# Patient Record
Sex: FEMALE | Race: WHITE | NOT HISPANIC OR LATINO | Employment: OTHER | URBAN - METROPOLITAN AREA
[De-identification: names, ages, dates, MRNs, and addresses within clinical notes are randomized per-mention and may not be internally consistent; named-entity substitution may affect disease eponyms.]

---

## 2018-04-08 ENCOUNTER — OFFICE VISIT (OUTPATIENT)
Dept: URGENT CARE | Age: 58
End: 2018-04-08
Payer: MEDICARE

## 2018-04-08 VITALS
DIASTOLIC BLOOD PRESSURE: 70 MMHG | RESPIRATION RATE: 20 BRPM | BODY MASS INDEX: 27.66 KG/M2 | TEMPERATURE: 97 F | SYSTOLIC BLOOD PRESSURE: 120 MMHG | OXYGEN SATURATION: 98 % | WEIGHT: 166 LBS | HEART RATE: 78 BPM | HEIGHT: 65 IN

## 2018-04-08 DIAGNOSIS — J02.9 PHARYNGITIS, UNSPECIFIED ETIOLOGY: Primary | ICD-10-CM

## 2018-04-08 LAB — S PYO AG THROAT QL: NEGATIVE

## 2018-04-08 PROCEDURE — 87430 STREP A AG IA: CPT | Performed by: FAMILY MEDICINE

## 2018-04-08 RX ORDER — LEVOTHYROXINE SODIUM 0.05 MG/1
TABLET ORAL
COMMUNITY
Start: 2015-06-08

## 2018-04-08 RX ORDER — DOCUSATE SODIUM 100 MG/1
100 CAPSULE, LIQUID FILLED ORAL
COMMUNITY
Start: 2018-03-28 | End: 2019-03-28

## 2018-04-08 RX ORDER — AMLODIPINE BESYLATE 5 MG/1
TABLET ORAL
COMMUNITY
Start: 2011-01-11

## 2018-04-08 RX ORDER — LISINOPRIL AND HYDROCHLOROTHIAZIDE 25; 20 MG/1; MG/1
TABLET ORAL
COMMUNITY
Start: 2015-06-08

## 2018-04-08 RX ORDER — ACETAMINOPHEN 325 MG/1
TABLET ORAL
Qty: 30 TABLET | Refills: 0 | Status: SHIPPED | OUTPATIENT
Start: 2018-04-08

## 2018-04-08 RX ORDER — LOVASTATIN 40 MG/1
1 TABLET ORAL DAILY
COMMUNITY
Start: 2011-05-05

## 2018-04-08 NOTE — PROGRESS NOTES
[facility]        NAME: Ez Rojas is a 59 y o  female  : 1960    MRN: [id number]  DATE: 2018  TIME: 12:47 PM    Assessment and Plan   Pharyngitis, unspecified etiology [J02 9]  1  Pharyngitis, unspecified etiology  POCT rapid strepA    acetaminophen (TYLENOL) 325 mg tablet         Patient Instructions     Patient Instructions   Rapid strep test is negative  No antibiotic indicated at this time  May give Tylenol as needed for pain  If patient has worsening abdominal pain, she may need further evaluation at the emergency room where more definitive testing can be done  Otherwise have patient follow up with family doctor as needed  Chief Complaint     Chief Complaint   Patient presents with    Sore Throat     symptoms started yesterday  No fever, coughing noted  History of Present Illness   Ez Rojas presents to the clinic c/o    73-year-old female brought in from a group home with complaint of sore throat that started yesterday  No cough or snot  She also had some abdominal discomfort this morning  She ate fine this morning but yesterday her appetite was a little less  She does have history of constipation and has been on a stool softener recently  Stools were softer than normal today  Review of Systems   Review of Systems   Constitutional: Negative  HENT: Positive for sore throat  Negative for congestion, ear discharge, ear pain, postnasal drip and rhinorrhea  Eyes: Negative  Respiratory: Negative  Cardiovascular: Negative  Gastrointestinal: Positive for abdominal pain, constipation and diarrhea  Negative for abdominal distention, blood in stool, nausea and vomiting           Current Medications     Long-Term Prescriptions   Medication Sig Dispense Refill    amLODIPine (NORVASC) 5 mg tablet Take by mouth      docusate sodium (COLACE) 100 mg capsule Take 100 mg by mouth      levothyroxine 50 mcg tablet Take one tab/cap by mouth daily in the AM on an empty stomach for thyroid      lisinopril-hydrochlorothiazide (PRINZIDE,ZESTORETIC) 20-25 MG per tablet Take one tab/cap by mouth daily at 8AM      lovastatin (MEVACOR) 40 MG tablet Take 1 tablet by mouth daily         Current Allergies     Allergies as of 04/08/2018 - Reviewed 04/08/2018   Allergen Reaction Noted    Bee venom Swelling 10/14/2015            The following portions of the patient's history were reviewed and updated as appropriate: allergies, current medications, past family history, past medical history, past social history, past surgical history and problem list     Objective   /70 (BP Location: Left arm, Patient Position: Sitting, Cuff Size: Standard)   Pulse 78   Temp (!) 97 °F (36 1 °C) (Temporal)   Resp 20   Ht 5' 5" (1 651 m)   Wt 75 3 kg (166 lb)   SpO2 98%   BMI 27 62 kg/m²        Physical Exam     Physical Exam   Constitutional: She appears well-developed and well-nourished  No distress  Patient accompanied by group home leader  and Co resident  Patient with obvious psychosocial disorder   HENT:   Head: Normocephalic and atraumatic  Right Ear: External ear normal    Left Ear: External ear normal    Nose: Nose normal    Mouth/Throat: Oropharynx is clear and moist  No oropharyngeal exudate  Eyes: Conjunctivae are normal  Right eye exhibits no discharge  Left eye exhibits no discharge  No scleral icterus  Neck: Normal range of motion  Neck supple  Cardiovascular: Normal rate and regular rhythm  Exam reveals no gallop and no friction rub  No murmur heard  Pulmonary/Chest: Effort normal and breath sounds normal  No respiratory distress  She has no wheezes  She has no rales  Abdominal: Soft  Bowel sounds are normal  She exhibits no distension and no mass  There is no tenderness  There is no rebound and no guarding  Lymphadenopathy:     She has no cervical adenopathy  Neurological: She is alert  Skin: Skin is warm and dry   She is not diaphoretic  Psychiatric: She has a normal mood and affect  Nursing note and vitals reviewed

## 2018-04-08 NOTE — PATIENT INSTRUCTIONS
Rapid strep test is negative  No antibiotic indicated at this time  May give Tylenol as needed for pain  If patient has worsening abdominal pain, she may need further evaluation at the emergency room where more definitive testing can be done  Otherwise have patient follow up with family doctor as needed

## 2018-10-14 ENCOUNTER — OFFICE VISIT (OUTPATIENT)
Dept: URGENT CARE | Age: 58
End: 2018-10-14
Payer: MEDICARE

## 2018-10-14 VITALS
BODY MASS INDEX: 31.18 KG/M2 | TEMPERATURE: 98.6 F | DIASTOLIC BLOOD PRESSURE: 70 MMHG | RESPIRATION RATE: 20 BRPM | OXYGEN SATURATION: 100 % | HEIGHT: 63 IN | SYSTOLIC BLOOD PRESSURE: 132 MMHG | HEART RATE: 72 BPM | WEIGHT: 176 LBS

## 2018-10-14 DIAGNOSIS — J02.9 SORE THROAT: ICD-10-CM

## 2018-10-14 DIAGNOSIS — J06.9 ACUTE URI: Primary | ICD-10-CM

## 2018-10-14 PROBLEM — E03.9 HYPOTHYROIDISM: Status: ACTIVE | Noted: 2018-10-14

## 2018-10-14 PROBLEM — E78.5 HYPERLIPIDEMIA: Status: ACTIVE | Noted: 2018-10-14

## 2018-10-14 PROBLEM — I10 ESSENTIAL HYPERTENSION, BENIGN: Status: ACTIVE | Noted: 2018-10-14

## 2018-10-14 PROBLEM — F79 MENTAL RETARDATION: Status: ACTIVE | Noted: 2018-10-14

## 2018-10-14 PROBLEM — E66.9 OBESITY: Status: ACTIVE | Noted: 2018-10-14

## 2018-10-14 LAB — S PYO AG THROAT QL: NEGATIVE

## 2018-10-14 PROCEDURE — 87430 STREP A AG IA: CPT | Performed by: FAMILY MEDICINE

## 2018-10-14 PROCEDURE — 99213 OFFICE O/P EST LOW 20 MIN: CPT | Performed by: FAMILY MEDICINE

## 2018-10-14 PROCEDURE — G0463 HOSPITAL OUTPT CLINIC VISIT: HCPCS | Performed by: FAMILY MEDICINE

## 2018-10-14 NOTE — PROGRESS NOTES
[facility]        NAME: Christophe Aly is a 59 y o  female  : 1960    MRN: [id number]  DATE: 2018  TIME: 10:36 AM    Assessment and Plan   Acute URI [J06 9]  1  Acute URI     2  Sore throat  POCT rapid strepA         Patient Instructions     Follow up with PCP in 3-5 days  Proceed to  ER if symptoms worsen  Chief Complaint     Chief Complaint   Patient presents with    Cough     Been sick for the last few days with evening and earlier am cough and and sore throat    Sore Throat         History of Present Illness       59-year-old female with history of mental retardation presents with caregiver from a group home for complaints of coughing and sore throat  Caregiver states her cough is been ongoing for approximately a week  In the last day or 2 she began to complain of a sore throat  She has not had any fever  Patient has been taking promethazine DM syrup as needed for cough  Review of Systems   Review of Systems   Constitutional: Negative  HENT: Positive for sore throat  Negative for congestion, ear discharge, ear pain, mouth sores, nosebleeds, postnasal drip, rhinorrhea, sinus pain, sinus pressure, sneezing, trouble swallowing and voice change  Eyes: Negative  Respiratory: Positive for cough  Negative for apnea, choking, chest tightness, shortness of breath and wheezing  Gastrointestinal: Negative  Skin: Negative for rash           Current Medications       Current Outpatient Prescriptions:     acetaminophen (TYLENOL) 325 mg tablet, I po q 4-6 hrs prn pain, Disp: 30 tablet, Rfl: 0    amLODIPine (NORVASC) 5 mg tablet, Take by mouth, Disp: , Rfl:     docusate sodium (COLACE) 100 mg capsule, Take 100 mg by mouth, Disp: , Rfl:     levothyroxine 50 mcg tablet, Take one tab/cap by mouth daily in the AM on an empty stomach for thyroid, Disp: , Rfl:     lisinopril-hydrochlorothiazide (PRINZIDE,ZESTORETIC) 20-25 MG per tablet, Take one tab/cap by mouth daily at 8AM, Disp: , Rfl:     lovastatin (MEVACOR) 40 MG tablet, Take 1 tablet by mouth daily, Disp: , Rfl:     Current Allergies     Allergies as of 10/14/2018 - Reviewed 10/14/2018   Allergen Reaction Noted    Bee venom Swelling 10/14/2015            The following portions of the patient's history were reviewed and updated as appropriate: allergies, current medications, past family history, past medical history, past social history, past surgical history and problem list     Objective   /70 (BP Location: Left arm, Patient Position: Sitting, Cuff Size: Standard)   Pulse 72   Temp 98 6 °F (37 °C) (Temporal)   Resp 20   Ht 5' 3" (1 6 m)   Wt 79 8 kg (176 lb)   SpO2 100%   BMI 31 18 kg/m²        Physical Exam     Physical Exam   Constitutional: Vital signs are normal  She appears well-developed and well-nourished  No distress  HENT:   Head: Normocephalic and atraumatic  Right Ear: Hearing, tympanic membrane, external ear and ear canal normal    Left Ear: Hearing, tympanic membrane, external ear and ear canal normal    Nose: Nose normal  No mucosal edema or rhinorrhea  Right sinus exhibits no maxillary sinus tenderness and no frontal sinus tenderness  Left sinus exhibits no maxillary sinus tenderness and no frontal sinus tenderness  Mouth/Throat: Uvula is midline and mucous membranes are normal  Posterior oropharyngeal erythema present  No oropharyngeal exudate, posterior oropharyngeal edema or tonsillar abscesses  Eyes: Conjunctivae and lids are normal    Cardiovascular: Normal rate, regular rhythm and normal heart sounds  No murmur heard  Pulmonary/Chest: Effort normal and breath sounds normal  No respiratory distress  She has no decreased breath sounds  She has no wheezes  She has no rhonchi  She has no rales  Lymphadenopathy:        Head (right side): No submandibular and no tonsillar adenopathy present  Head (left side): No submandibular and no tonsillar adenopathy present  Skin: No rash noted  Nursing note and vitals reviewed

## 2018-10-14 NOTE — PATIENT INSTRUCTIONS
Rapid strep test was negative  May use p r n  medications as needed  Cold Symptoms   WHAT YOU NEED TO KNOW:   A cold is an infection caused by a virus  The infection causes your upper respiratory system to become inflamed  Common symptoms of a cold include sneezing, dry throat, a stuffy nose, headache, watery eyes, and a cough  Your cough may be dry, or you may cough up mucus  You may also have muscle aches, joint pain, and tiredness  Rarely, you may have a fever  Most colds go away without treatment  DISCHARGE INSTRUCTIONS:   Return to the emergency department if:   · You have increased tiredness and weakness  · You are unable to eat  · Your heart is beating much faster than usual for you  · You see white spots in the back of your throat and your neck is swollen and sore to the touch  · You see pinpoint or larger reddish-purple dots on your skin  Contact your healthcare provider if:   · You have a fever higher than 102°F (38 9°C)  · You have new or worsening shortness of breath  · You have thick nasal drainage for more than 2 days  · Your symptoms do not improve or get worse within 5 days  · You have questions or concerns about your condition or care  Medicines: The following medicines may be suggested by your healthcare provider to decrease your cold symptoms  These medicines are available without a doctor's order  Ask which medicines to take and when to take them  Follow directions  · NSAIDs or acetaminophen  help to bring down a fever or decrease pain  · Decongestants  help decrease nasal stuffiness  · Antihistamines  help decrease sneezing and a runny nose  · Cough suppressants  help decrease how much you cough  · Expectorants  help loosen mucus so you can cough it up  · Take your medicine as directed  Contact your healthcare provider if you think your medicine is not helping or if you have side effects   Tell him of her if you are allergic to any medicine  Keep a list of the medicines, vitamins, and herbs you take  Include the amounts, and when and why you take them  Bring the list or the pill bottles to follow-up visits  Carry your medicine list with you in case of an emergency  Symptom relief: The following may help relieve cold symptoms, such as a dry throat and congestion:  · Gargle with mouthwash or warm salt water as directed  · Suck on throat lozenges or hard candy  · Use a cold or warm vaporizer or humidifier to ease your breathing  · Rest for at least 2 days and then as needed to decrease tiredness and weakness  · Use petroleum based jelly around your nostrils to decrease irritation from blowing your nose  Drink liquids:  Liquids will help thin and loosen thick mucus so you can cough it up  Liquids will also keep you hydrated  Ask your healthcare provider which liquids are best for you and how much to drink each day  Prevent the spread of germs: You can spread your cold germs to others for at least 3 days after your symptoms start  Wash your hands often  Do not share items, such as eating utensils  Cover your nose and mouth when you cough or sneeze using the crook of your elbow instead of your hands  Throw used tissues in the garbage  Do not smoke:  Smoking may worsen your symptoms and increase the length of time you feel sick  Talk with your healthcare provider if you need help to stop smoking  Follow up with your healthcare provider as directed:  Write down your questions so you remember to ask them during your visits  © 2017 [employer] Information is for End User's use only and may not be sold, redistributed or otherwise used for commercial purposes  All illustrations and images included in CareNotes® are the copyrighted property of [employer] , [employer]  or [employer]  The above information is an  only  It is not intended as medical advice for individual conditions or treatments  Talk to your doctor, nurse or pharmacist before following any medical regimen to see if it is safe and effective for you

## 2019-12-14 ENCOUNTER — OFFICE VISIT (OUTPATIENT)
Dept: URGENT CARE | Age: 59
End: 2019-12-14
Payer: MEDICARE

## 2019-12-14 VITALS
SYSTOLIC BLOOD PRESSURE: 133 MMHG | OXYGEN SATURATION: 99 % | HEART RATE: 67 BPM | WEIGHT: 160 LBS | RESPIRATION RATE: 18 BRPM | TEMPERATURE: 98 F | BODY MASS INDEX: 32.25 KG/M2 | HEIGHT: 59 IN | DIASTOLIC BLOOD PRESSURE: 64 MMHG

## 2019-12-14 DIAGNOSIS — J02.9 SORE THROAT: Primary | ICD-10-CM

## 2019-12-14 DIAGNOSIS — J06.9 ACUTE URI: ICD-10-CM

## 2019-12-14 LAB — S PYO AG THROAT QL: NEGATIVE

## 2019-12-14 PROCEDURE — 87070 CULTURE OTHR SPECIMN AEROBIC: CPT | Performed by: PHYSICIAN ASSISTANT

## 2019-12-14 PROCEDURE — G0463 HOSPITAL OUTPT CLINIC VISIT: HCPCS | Performed by: PHYSICIAN ASSISTANT

## 2019-12-14 PROCEDURE — 87880 STREP A ASSAY W/OPTIC: CPT | Performed by: PHYSICIAN ASSISTANT

## 2019-12-14 PROCEDURE — 99213 OFFICE O/P EST LOW 20 MIN: CPT | Performed by: PHYSICIAN ASSISTANT

## 2019-12-14 NOTE — PATIENT INSTRUCTIONS
Your rapid strep test was negative  No antibiotic indicated at this time  Throat swab will be sent for definitive culture  Results take approximately 48-72 hours to return  If you have not heard from the provider by the end of 3 business days, please call phone number at top of clinical summary to request the results  In the meantime you may do warm salt water gargles, over-the-counter medications and throat lozenges as needed        Follow-up with your primary care physician in 3-4 days if symptoms persist    Go to emergency room if symptoms are worsening

## 2019-12-14 NOTE — PROGRESS NOTES
[facility]        NAME: Patria Qiu is a 62 y o  female  : 1960    MRN: [id number]  DATE: 2019  TIME: 4:13 PM    Assessment and Plan   Sore throat [J02 9]  1  Sore throat  POCT rapid strepA    Throat culture   2  Acute URI           Patient Instructions   Your rapid strep test was negative  No antibiotic indicated at this time  Throat swab will be sent for definitive culture  Results take approximately 48-72 hours to return  If you have not heard from the provider by the end of 3 business days, please call phone number at top of clinical summary to request the results  In the meantime you may do warm salt water gargles, over-the-counter medications and throat lozenges as needed  Follow-up with your primary care physician in 3-4 days if symptoms persist    Go to emergency room if symptoms are worsening      Chief Complaint     Chief Complaint   Patient presents with    Sore Throat     Pt complaining of sore throat x1 day  History of Present Illness       Patient has been complaining of a sore throat cough for 1 day  She denies any fevers  She is not taking anything for the pain  She denies any other symptoms       Review of Systems   Review of Systems   Constitutional: Negative  HENT: Positive for sore throat  Respiratory: Positive for cough  Cardiovascular: Negative  Gastrointestinal: Negative  Musculoskeletal: Negative  Neurological: Negative  Psychiatric/Behavioral: Negative            Current Medications       Current Outpatient Medications:     amLODIPine (NORVASC) 5 mg tablet, Take by mouth, Disp: , Rfl:     levothyroxine 50 mcg tablet, Take one tab/cap by mouth daily in the AM on an empty stomach for thyroid, Disp: , Rfl:     lisinopril-hydrochlorothiazide (PRINZIDE,ZESTORETIC) 20-25 MG per tablet, Take one tab/cap by mouth daily at 8AM, Disp: , Rfl:     lovastatin (MEVACOR) 40 MG tablet, Take 1 tablet by mouth daily, Disp: , Rfl:     acetaminophen (TYLENOL) 325 mg tablet, I po q 4-6 hrs prn pain (Patient not taking: Reported on 12/14/2019), Disp: 30 tablet, Rfl: 0    docusate sodium (COLACE) 100 mg capsule, Take 100 mg by mouth, Disp: , Rfl:     Current Allergies     Allergies as of 12/14/2019 - Reviewed 12/14/2019   Allergen Reaction Noted    Bee venom Swelling 10/14/2015            The following portions of the patient's history were reviewed and updated as appropriate: allergies, current medications, past family history, past medical history, past social history, past surgical history and problem list      Past Medical History:   Diagnosis Date    Hypertension     Hypothyroidism 10/14/2018    Obesity 10/14/2018       History reviewed  No pertinent surgical history  Family History   Problem Relation Age of Onset    No Known Problems Mother     No Known Problems Father          Medications have been verified  Objective   /64 (BP Location: Right arm, Patient Position: Sitting)   Pulse 67   Temp 98 °F (36 7 °C) (Temporal)   Resp 18   Ht 4' 11" (1 499 m)   Wt 72 6 kg (160 lb)   SpO2 99%   BMI 32 32 kg/m²        Physical Exam     Physical Exam   Constitutional: She is oriented to person, place, and time  She appears well-developed and well-nourished  Non-toxic appearance  She does not appear ill  No distress  HENT:   Head: Normocephalic and atraumatic  Right Ear: Tympanic membrane normal    Left Ear: Tympanic membrane normal    Mouth/Throat: No oropharyngeal exudate, posterior oropharyngeal edema or posterior oropharyngeal erythema  No tonsillar exudate  Cardiovascular: Normal rate, regular rhythm and normal heart sounds  Pulmonary/Chest: Effort normal and breath sounds normal    Neurological: She is alert and oriented to person, place, and time  Skin: Skin is warm and dry  Psychiatric: She has a normal mood and affect  Her behavior is normal    Nursing note and vitals reviewed

## 2019-12-16 LAB — BACTERIA THROAT CULT: NORMAL

## 2020-12-08 ENCOUNTER — PREPPED CHART (OUTPATIENT)
Dept: URBAN - METROPOLITAN AREA CLINIC 6 | Facility: CLINIC | Age: 60
End: 2020-12-08

## 2022-02-09 ENCOUNTER — ESTABLISHED COMPREHENSIVE EXAM (OUTPATIENT)
Dept: URBAN - METROPOLITAN AREA CLINIC 6 | Facility: CLINIC | Age: 62
End: 2022-02-09

## 2022-02-09 DIAGNOSIS — Z96.1: ICD-10-CM

## 2022-02-09 DIAGNOSIS — H50.10: ICD-10-CM

## 2022-02-09 DIAGNOSIS — H25.811: ICD-10-CM

## 2022-02-09 PROCEDURE — 92014 COMPRE OPH EXAM EST PT 1/>: CPT

## 2022-02-09 ASSESSMENT — VISUAL ACUITY
OS_CC: 20/60+2
OD_CC: CF 2FT

## 2022-02-09 ASSESSMENT — TONOMETRY
OD_IOP_MMHG: 12
OS_IOP_MMHG: 10